# Patient Record
Sex: MALE | URBAN - METROPOLITAN AREA
[De-identification: names, ages, dates, MRNs, and addresses within clinical notes are randomized per-mention and may not be internally consistent; named-entity substitution may affect disease eponyms.]

---

## 2017-05-01 ENCOUNTER — FOLLOW UP ESTABLISHED (OUTPATIENT)
Dept: URBAN - METROPOLITAN AREA CLINIC 10 | Facility: CLINIC | Age: 82
End: 2017-05-01
Payer: COMMERCIAL

## 2017-05-01 DIAGNOSIS — H33.311 HORSESHOE TEAR OF RETINA WITHOUT DETACHMENT, RIGHT EYE: ICD-10-CM

## 2017-05-01 PROCEDURE — 92014 COMPRE OPH EXAM EST PT 1/>: CPT | Performed by: OPTOMETRIST

## 2017-05-01 PROCEDURE — 92015 DETERMINE REFRACTIVE STATE: CPT | Performed by: OPTOMETRIST

## 2017-05-01 ASSESSMENT — INTRAOCULAR PRESSURE
OD: 13
OS: 13

## 2017-05-01 ASSESSMENT — VISUAL ACUITY
OS: 20/25
OD: 20/25

## 2017-05-01 ASSESSMENT — KERATOMETRY
OS: 45.25
OD: 44.75

## 2018-09-04 ENCOUNTER — FOLLOW UP ESTABLISHED (OUTPATIENT)
Dept: URBAN - METROPOLITAN AREA CLINIC 10 | Facility: CLINIC | Age: 83
End: 2018-09-04
Payer: COMMERCIAL

## 2018-09-04 DIAGNOSIS — H53.19 OTHER SUBJECTIVE VISUAL DISTURBANCES: Primary | ICD-10-CM

## 2018-09-04 DIAGNOSIS — H35.3111 NEXDTVE AGE-RELATED MCLR DEGN, RIGHT EYE, EARLY DRY STAGE: ICD-10-CM

## 2018-09-04 PROCEDURE — 92134 CPTRZ OPH DX IMG PST SGM RTA: CPT | Performed by: OPTOMETRIST

## 2018-09-04 PROCEDURE — 92014 COMPRE OPH EXAM EST PT 1/>: CPT | Performed by: OPTOMETRIST

## 2018-09-04 PROCEDURE — 92015 DETERMINE REFRACTIVE STATE: CPT | Performed by: OPTOMETRIST

## 2018-09-04 ASSESSMENT — KERATOMETRY
OS: 45.25
OD: 44.75

## 2018-09-04 ASSESSMENT — VISUAL ACUITY
OS: 20/25
OD: 20/30

## 2018-09-04 ASSESSMENT — INTRAOCULAR PRESSURE
OS: 12
OD: 12

## 2020-01-07 ENCOUNTER — FOLLOW UP ESTABLISHED (OUTPATIENT)
Dept: URBAN - METROPOLITAN AREA CLINIC 10 | Facility: CLINIC | Age: 85
End: 2020-01-07
Payer: COMMERCIAL

## 2020-01-07 DIAGNOSIS — H25.813 COMBINED FORMS OF AGE-RELATED CATARACT, BILATERAL: Primary | ICD-10-CM

## 2020-01-07 PROCEDURE — 92134 CPTRZ OPH DX IMG PST SGM RTA: CPT | Performed by: OPTOMETRIST

## 2020-01-07 PROCEDURE — 92015 DETERMINE REFRACTIVE STATE: CPT | Performed by: OPTOMETRIST

## 2020-01-07 PROCEDURE — 92014 COMPRE OPH EXAM EST PT 1/>: CPT | Performed by: OPTOMETRIST

## 2020-01-07 ASSESSMENT — VISUAL ACUITY
OD: 20/30
OS: 20/25

## 2021-02-03 ENCOUNTER — FOLLOW UP ESTABLISHED (OUTPATIENT)
Dept: URBAN - METROPOLITAN AREA CLINIC 10 | Facility: CLINIC | Age: 86
End: 2021-02-03
Payer: COMMERCIAL

## 2021-02-03 DIAGNOSIS — H35.3131 NONEXUDATIVE AGE-RELATED MACULAR DEGENERATION, BILATERAL, EARLY DRY STAGE: ICD-10-CM

## 2021-02-03 PROCEDURE — 92134 CPTRZ OPH DX IMG PST SGM RTA: CPT | Performed by: OPTOMETRIST

## 2021-02-03 PROCEDURE — 92014 COMPRE OPH EXAM EST PT 1/>: CPT | Performed by: OPTOMETRIST

## 2021-02-03 ASSESSMENT — VISUAL ACUITY
OS: 20/40
OD: 20/50

## 2021-02-03 ASSESSMENT — INTRAOCULAR PRESSURE
OS: 11
OD: 13

## 2022-12-15 ENCOUNTER — OFFICE VISIT (OUTPATIENT)
Dept: URBAN - NONMETROPOLITAN AREA CLINIC 18 | Facility: CLINIC | Age: 87
End: 2022-12-15
Payer: COMMERCIAL

## 2022-12-15 DIAGNOSIS — H25.813 COMBINED FORMS OF AGE-RELATED CATARACT, BILATERAL: ICD-10-CM

## 2022-12-15 DIAGNOSIS — H50.05 ALTERNATING ESOTROPIA: ICD-10-CM

## 2022-12-15 DIAGNOSIS — H52.223 REGULAR ASTIGMATISM, BILATERAL: Primary | ICD-10-CM

## 2022-12-15 PROCEDURE — 92014 COMPRE OPH EXAM EST PT 1/>: CPT | Performed by: OPTOMETRIST

## 2022-12-15 ASSESSMENT — KERATOMETRY
OS: 45.00
OD: 44.63

## 2022-12-15 ASSESSMENT — VISUAL ACUITY
OS: 20/30
OD: 20/40

## 2022-12-15 ASSESSMENT — INTRAOCULAR PRESSURE
OD: 11
OS: 11

## 2022-12-15 NOTE — IMPRESSION/PLAN
Impression: Regular astigmatism, bilateral: H52.223. Plan: Reviewed refractive prescription in detail with patient and ways glasses can be used  to improve vision. Release spectacle prescription at this time. Reviewed risk associated with not dilating pupils. Patient understands risk and declines diagnostic agents.  Patient knows to RTC immediately if flashes, floaters or changes in vision are experienced

## 2024-01-19 ENCOUNTER — OFFICE VISIT (OUTPATIENT)
Dept: URBAN - NONMETROPOLITAN AREA CLINIC 18 | Facility: CLINIC | Age: 89
End: 2024-01-19
Payer: COMMERCIAL

## 2024-01-19 DIAGNOSIS — H35.3131 BILATERAL NONEXUDATIVE AGE-RELATED MACULAR DEGENERATION, EARLY DRY STAGE: Primary | ICD-10-CM

## 2024-01-19 DIAGNOSIS — H25.813 COMBINED FORMS OF AGE-RELATED CATARACT, BILATERAL: ICD-10-CM

## 2024-01-19 DIAGNOSIS — H43.813 VITREOUS DEGENERATION, BILATERAL: ICD-10-CM

## 2024-01-19 DIAGNOSIS — H04.123 DRY EYE SYNDROME OF BILATERAL LACRIMAL GLANDS: ICD-10-CM

## 2024-01-19 PROCEDURE — 92134 CPTRZ OPH DX IMG PST SGM RTA: CPT | Performed by: OPTOMETRIST

## 2024-01-19 PROCEDURE — 99213 OFFICE O/P EST LOW 20 MIN: CPT | Performed by: OPTOMETRIST

## 2024-01-19 ASSESSMENT — INTRAOCULAR PRESSURE
OS: 12
OD: 12

## 2024-01-19 ASSESSMENT — VISUAL ACUITY
OS: 20/40
OD: 20/40

## 2024-04-11 ENCOUNTER — OFFICE VISIT (OUTPATIENT)
Dept: URBAN - METROPOLITAN AREA CLINIC 10 | Facility: CLINIC | Age: 89
End: 2024-04-11
Payer: MEDICARE

## 2024-04-11 DIAGNOSIS — H43.312 VITREOUS MEMBRANES AND STRANDS, LEFT EYE: Primary | ICD-10-CM

## 2024-04-11 DIAGNOSIS — H35.3131 NONEXUDATIVE MACULAR DEGENERATION, EARLY DRY STAGE, BILATERAL: ICD-10-CM

## 2024-04-11 DIAGNOSIS — H25.813 COMBINED FORMS OF AGE-RELATED CATARACT, BILATERAL: ICD-10-CM

## 2024-04-11 PROCEDURE — 99204 OFFICE O/P NEW MOD 45 MIN: CPT | Performed by: OPTOMETRIST

## 2024-04-11 PROCEDURE — 92134 CPTRZ OPH DX IMG PST SGM RTA: CPT | Performed by: OPTOMETRIST

## 2024-04-11 ASSESSMENT — INTRAOCULAR PRESSURE
OS: 12
OD: 12

## 2024-04-11 ASSESSMENT — VISUAL ACUITY
OS: 20/30
OD: 20/40

## 2024-04-11 ASSESSMENT — KERATOMETRY
OS: 45.00
OD: 44.88